# Patient Record
Sex: FEMALE | Race: WHITE | NOT HISPANIC OR LATINO | Employment: FULL TIME | URBAN - METROPOLITAN AREA
[De-identification: names, ages, dates, MRNs, and addresses within clinical notes are randomized per-mention and may not be internally consistent; named-entity substitution may affect disease eponyms.]

---

## 2024-07-29 ENCOUNTER — THERAPY VISIT (OUTPATIENT)
Dept: PHYSICAL THERAPY | Facility: CLINIC | Age: 59
End: 2024-07-29
Payer: COMMERCIAL

## 2024-07-29 DIAGNOSIS — N81.10 VAGINAL PROLAPSE: ICD-10-CM

## 2024-07-29 DIAGNOSIS — N81.4 UTEROVAGINAL PROLAPSE: Primary | ICD-10-CM

## 2024-07-29 DIAGNOSIS — N81.89 PELVIC FLOOR WEAKNESS: ICD-10-CM

## 2024-07-29 PROCEDURE — 97110 THERAPEUTIC EXERCISES: CPT | Mod: GP | Performed by: PHYSICAL THERAPIST

## 2024-07-29 PROCEDURE — 97161 PT EVAL LOW COMPLEX 20 MIN: CPT | Mod: GP | Performed by: PHYSICAL THERAPIST

## 2024-07-29 PROCEDURE — 97530 THERAPEUTIC ACTIVITIES: CPT | Mod: GP | Performed by: PHYSICAL THERAPIST

## 2024-07-29 NOTE — PROGRESS NOTES
"PHYSICAL THERAPY EVALUATION  Type of Visit: Evaluation       Fall Risk Screen:  Fall screen completed by: PT  Have you fallen 2 or more times in the past year?: No  Have you fallen and had an injury in the past year?: No  Is patient a fall risk?: No    Subjective       Presenting condition or subjective complaint: Patient reports several years of noticing a bulging in her vagina. She states that this bulging/pressure feeling \"comes and goes\" however currently she is feeling this almost daily. She is aware of this when wiping after urinating or after a BM and pressure often when sitting. She pushes the bulge back up into her vagina 1-2 times per week.   Date of onset: 05/30/24 (MD order for PT)    Relevant medical history: Overweight   Dates & types of surgery: Dental implant >10 years    Prior diagnostic imaging/testing results:       Prior therapy history for the same diagnosis, illness or injury: No      Prior Level of Function  Transfers:   Ambulation:   ADL:   IADL:     Living Environment  Social support: With a significant other or spouse   Type of home: [facility]; 2-story   Stairs to enter the home: No       Ramp: Yes   Stairs inside the home: Yes 15 Is there a railing: Yes     Help at home: Home management tasks (cooking, cleaning)  Equipment owned:       Employment: [employer]  Hobbies/Interests: Cooking, dining, community events    Patient goals for therapy: Worry less about condition    Pain assessment: none     Objective      PELVIC EVALUATION  ADDITIONAL HISTORY:  Sex assigned at birth: Female  Gender identity: Female    Pronouns: She/Her Hers      Bladder History:  Feels bladder filling: Yes  Triggers for feeling of inability to wait to go to the bathroom: Yes Increased fluid intake  How long can you wait to urinate: It depends  Gets up at night to urinate: No    Can stop the flow of urine when urinating: Yes  Volume of urine usually released: Medium   Other issues: Dribbling after " urinating  Number of bladder infections in last 12 months:    Fluid intake per day: 16-24ox Occasional 8-12oz 16oz once ir twice weekly  Medications taken for bladder: No     Activities causing urine leak: Hurrying to the bathroom due to a strong urge to urinate (pee)    Amount of urine typically leaked:    Pads used to help with leaking: No        Bowel History:  Frequency of bowel movement: Once daily  Consistency of stool: Hard    Ignores the urge to defecate: Sometimes  Other bowel issues: Straining to have bowel movement  Length of time spent trying to have a bowel movement:      Sexual Function History:  Sexual orientation: Straight    Sexually active: Yes  Lubrication used: No No  Pelvic pain:      Pain or difficulty with orgasms/erection/ejaculation: No    State of menopause: Post-menopause (I am done with menopause)  Hormone medications: No      Are you currently pregnant: No  Number of previous pregnancies: 4  Number of deliveries: 4  If you have delivered before, did you have any of these issues during delivery: Vaginal delivery  Have you been diagnosed with pelvic prolapse or abdominal separation: Yes  Do you get regular exercise: No  Have you tried pelvic floor strengthening exercises for 4 weeks: No  Do you have any history of trauma that is relevant to your care that you d like to share: No      Discussed reason for referral regarding pelvic health needs and external/internal pelvic floor muscle examination with patient/guardian.  Opportunity provided to ask questions and verbal consent for assessment and intervention was given.    PAIN: none  POSTURE:   LUMBAR SCREEN:   HIP SCREEN:  Strength:    Functional Strength Testing:     PELVIC/SI SCREEN:     PAIN PROVOCATION TEST:   PELVIS/SI SPECIAL TESTS:   BREATHING SYMMETRY:     PELVIC EXAM  External Visual Inspection:  At rest: Normal  With voluntary pelvic floor contraction: Present  Relaxation of PFM: Partial/delayed relaxation  With intra-abdominal  "pressure: Bearing down as defecation: Perineal descent    Integumentary:   Introitus: Unremarkable    External Digital Palpation per Perineum:       Scar:   Location/Type:   Mobility:     Internal Digital Palpation:  Per Vagina:  Myofascial Resistance to Palpation: Soft  Digital Muscle Performance: P (Power): 2+/5  E (Endurance): 3\" hold  R (Repetitions): 4  F (Fast Twitch): 5  Compensations: Abdominals  Relaxation Post-Contraction: Partial/delayed relaxation    Per Rectum:        Pelvic Organ Prolapse:   Apical: Past the level of the hymen    ABDOMINAL ASSESSMENT  Diastasis Rectus Abdominis (WALT):  WALT presence: No    Abdominal Activation/Strength:     Scar:   Location/Type:   Mobility:     Fascial Tension/Restriction:     BIOFEEDBACK:  Position:   Surface Electrodes:     Abdominals:     Perianals:       DERMATOMES:   DTR S:     Assessment & Plan   CLINICAL IMPRESSIONS  Medical Diagnosis: Uterovaginal prolapse    Treatment Diagnosis: vaginal prolapse, PF weakness   Impression/Assessment: Patient is a 59 year old female with vaginal prolapse complaints.  The following significant findings have been identified: Decreased strength and Impaired muscle performance. These impairments interfere with their ability to perform self care tasks and household chores as compared to previous level of function.     Clinical Decision Making (Complexity):  Clinical Presentation: Stable/Uncomplicated  Clinical Presentation Rationale: based on medical and personal factors listed in PT evaluation  Clinical Decision Making (Complexity): Low complexity    PLAN OF CARE  Treatment Interventions:  Interventions: Neuromuscular Re-education, Therapeutic Activity, Therapeutic Exercise, Self-Care/Home Management    Long Term Goals     PT Goal 1  Goal Identifier: pelvic floor muscle strength, endurance  Goal Description: Increase strength to MMT 3+/5 8-10\" hold, full relaxation (baseline:MMT 2+/5 3\" hold, partial delayed relaxation)  Rationale: " to maximize safety and independence with performance of ADLs and functional tasks;to maximize safety and independence with self cares (to support vaginal prolapse)  Target Date: 10/21/24      Frequency of Treatment: 1x wk every other week  Duration of Treatment: 12 weeks    Recommended Referrals to Other Professionals: Physical Therapy  Education Assessment:   Learner/Method: Patient;Listening;Pictures/Video    Risks and benefits of evaluation/treatment have been explained.   Patient/Family/caregiver agrees with Plan of Care.     Evaluation Time:     PT Eval, Low Complexity Minutes (17085): 22       Signing Clinician: Liz Peters PT